# Patient Record
Sex: MALE | Race: WHITE
[De-identification: names, ages, dates, MRNs, and addresses within clinical notes are randomized per-mention and may not be internally consistent; named-entity substitution may affect disease eponyms.]

---

## 2019-10-31 ENCOUNTER — HOSPITAL ENCOUNTER (INPATIENT)
Dept: HOSPITAL 15 - ER | Age: 74
LOS: 1 days | Discharge: HOME | DRG: 603 | End: 2019-11-01
Attending: INTERNAL MEDICINE | Admitting: INTERNAL MEDICINE
Payer: COMMERCIAL

## 2019-10-31 VITALS — WEIGHT: 257.94 LBS | BODY MASS INDEX: 36.11 KG/M2 | HEIGHT: 71 IN

## 2019-10-31 VITALS — DIASTOLIC BLOOD PRESSURE: 76 MMHG | SYSTOLIC BLOOD PRESSURE: 133 MMHG

## 2019-10-31 DIAGNOSIS — S91.002A: ICD-10-CM

## 2019-10-31 DIAGNOSIS — Z79.01: ICD-10-CM

## 2019-10-31 DIAGNOSIS — Z83.3: ICD-10-CM

## 2019-10-31 DIAGNOSIS — E11.22: ICD-10-CM
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
## 2019-10-31 DIAGNOSIS — N18.9: ICD-10-CM

## 2019-10-31 DIAGNOSIS — I48.92: ICD-10-CM

## 2019-10-31 DIAGNOSIS — E78.5: ICD-10-CM

## 2019-10-31 DIAGNOSIS — Z87.891: ICD-10-CM

## 2019-10-31 DIAGNOSIS — L03.116: Primary | ICD-10-CM

## 2019-10-31 DIAGNOSIS — E11.622: ICD-10-CM

## 2019-10-31 DIAGNOSIS — I47.1: ICD-10-CM

## 2019-10-31 DIAGNOSIS — L97.329: ICD-10-CM

## 2019-10-31 DIAGNOSIS — I12.9: ICD-10-CM
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
## 2019-10-31 DIAGNOSIS — I48.0: ICD-10-CM

## 2019-10-31 LAB
ALBUMIN SERPL-MCNC: 3.7 G/DL (ref 3.4–5)
ALP SERPL-CCNC: 109 U/L (ref 45–117)
ALT SERPL-CCNC: 24 U/L (ref 16–61)
ANION GAP SERPL CALCULATED.3IONS-SCNC: 8 MMOL/L (ref 5–15)
BILIRUB SERPL-MCNC: 0.7 MG/DL (ref 0.2–1)
BUN SERPL-MCNC: 19 MG/DL (ref 7–18)
BUN/CREAT SERPL: 14.1
CALCIUM SERPL-MCNC: 9.8 MG/DL (ref 8.5–10.1)
CHLORIDE SERPL-SCNC: 104 MMOL/L (ref 98–107)
CO2 SERPL-SCNC: 25 MMOL/L (ref 21–32)
GLUCOSE SERPL-MCNC: 187 MG/DL (ref 74–106)
HCT VFR BLD AUTO: 41 % (ref 41–53)
HGB BLD-MCNC: 13.7 G/DL (ref 13.5–17.5)
MAGNESIUM SERPL-MCNC: 1.8 MG/DL (ref 1.6–2.6)
MCH RBC QN AUTO: 30.2 PG (ref 28–32)
MCV RBC AUTO: 90.3 FL (ref 80–100)
NRBC BLD QL AUTO: 0 %
POTASSIUM SERPL-SCNC: 4.5 MMOL/L (ref 3.5–5.1)
PROT SERPL-MCNC: 7.9 G/DL (ref 6.4–8.2)
SODIUM SERPL-SCNC: 137 MMOL/L (ref 136–145)

## 2019-10-31 PROCEDURE — 93005 ELECTROCARDIOGRAM TRACING: CPT

## 2019-10-31 PROCEDURE — 71045 X-RAY EXAM CHEST 1 VIEW: CPT

## 2019-10-31 PROCEDURE — 93971 EXTREMITY STUDY: CPT

## 2019-10-31 PROCEDURE — 85025 COMPLETE CBC W/AUTO DIFF WBC: CPT

## 2019-10-31 PROCEDURE — 93306 TTE W/DOPPLER COMPLETE: CPT

## 2019-10-31 PROCEDURE — 83880 ASSAY OF NATRIURETIC PEPTIDE: CPT

## 2019-10-31 PROCEDURE — 83735 ASSAY OF MAGNESIUM: CPT

## 2019-10-31 PROCEDURE — 81001 URINALYSIS AUTO W/SCOPE: CPT

## 2019-10-31 PROCEDURE — 84484 ASSAY OF TROPONIN QUANT: CPT

## 2019-10-31 PROCEDURE — 80053 COMPREHEN METABOLIC PANEL: CPT

## 2019-10-31 PROCEDURE — 94761 N-INVAS EAR/PLS OXIMETRY MLT: CPT

## 2019-10-31 PROCEDURE — 36415 COLL VENOUS BLD VENIPUNCTURE: CPT

## 2019-10-31 RX ADMIN — METOPROLOL TARTRATE SCH MG: 25 TABLET, FILM COATED ORAL at 22:00

## 2019-10-31 RX ADMIN — ENOXAPARIN SODIUM SCH MG: 100 INJECTION SUBCUTANEOUS at 21:38

## 2019-10-31 NOTE — NUR
Telemetry admit from ER

CHRISTIANO YING admitted to Telemetry unit after SBAR received.  Patient oriented to RAIMUNDO LIGHT RN primary RN, unit, room, bed, and unit policies regarding patient care and 
visiting hours. Patient now on continuous telemetry monitoring, tele box # 65 and telemetry 
reading on arrival to unit is A-flutter 108 bpm. Patient weighed by bedscale and encouraged 
to call if they need something. All questions and concerns addressed, patient verbalized 
understanding. 

Patient is A/O x4, no complaints of pain, distress, or SOB, he is on RA, ambulatory. Call 
light is within reach, side rails up x2, bed is in lowest position.

## 2019-11-01 VITALS — DIASTOLIC BLOOD PRESSURE: 72 MMHG | SYSTOLIC BLOOD PRESSURE: 121 MMHG

## 2019-11-01 VITALS — DIASTOLIC BLOOD PRESSURE: 82 MMHG | SYSTOLIC BLOOD PRESSURE: 122 MMHG

## 2019-11-01 VITALS — DIASTOLIC BLOOD PRESSURE: 68 MMHG | SYSTOLIC BLOOD PRESSURE: 115 MMHG

## 2019-11-01 VITALS — DIASTOLIC BLOOD PRESSURE: 80 MMHG | SYSTOLIC BLOOD PRESSURE: 136 MMHG

## 2019-11-01 LAB
ALBUMIN SERPL-MCNC: 3.3 G/DL (ref 3.4–5)
ALP SERPL-CCNC: 86 U/L (ref 45–117)
ALT SERPL-CCNC: 20 U/L (ref 16–61)
ANION GAP SERPL CALCULATED.3IONS-SCNC: 8 MMOL/L (ref 5–15)
BILIRUB SERPL-MCNC: 0.5 MG/DL (ref 0.2–1)
BUN SERPL-MCNC: 17 MG/DL (ref 7–18)
BUN/CREAT SERPL: 15.2
CALCIUM SERPL-MCNC: 9.4 MG/DL (ref 8.5–10.1)
CHLORIDE SERPL-SCNC: 108 MMOL/L (ref 98–107)
CO2 SERPL-SCNC: 24 MMOL/L (ref 21–32)
GLUCOSE SERPL-MCNC: 147 MG/DL (ref 74–106)
HCT VFR BLD AUTO: 36.9 % (ref 41–53)
HGB BLD-MCNC: 12.6 G/DL (ref 13.5–17.5)
MAGNESIUM SERPL-MCNC: 1.9 MG/DL (ref 1.6–2.6)
MCH RBC QN AUTO: 30.7 PG (ref 28–32)
MCV RBC AUTO: 90.1 FL (ref 80–100)
NRBC BLD QL AUTO: 0.1 %
POTASSIUM SERPL-SCNC: 4.4 MMOL/L (ref 3.5–5.1)
PROT SERPL-MCNC: 6.9 G/DL (ref 6.4–8.2)
SODIUM SERPL-SCNC: 140 MMOL/L (ref 136–145)

## 2019-11-01 RX ADMIN — METOPROLOL TARTRATE SCH MG: 25 TABLET, FILM COATED ORAL at 09:13

## 2019-11-01 RX ADMIN — ENOXAPARIN SODIUM SCH MG: 100 INJECTION SUBCUTANEOUS at 09:14

## 2019-11-01 NOTE — NUR
WOUND CARE NOTE: IN TO SEE PATIENT AT THIS TIME PER WOUND CARE CONSULT REQUEST. PATIENT 
ADMITTED TO [facility] WITH SVT, A FLUTTER. CURRENT PRATIBHA SCORE IS 20. PATIENT IS FULLY 
AMBULATORY.  HE HAS HISTORY WITH DVT, VARICOSE VEINS, AND VENOUS STASIS ULCERS.  THERE IS 
HEMOSIDERIN STAIN NOTED TO BILATERAL LOWER EXTREMITIES.  HE HAS AN INTACT VENOUS STASIS 
ULCER TO THE LEFT MEDIAL ANKLE.  WOUND IS NOT OPEN OR DRAINING.  WOUND IS SCABBED CLOSED, 
WITH BROWN ESCHAR SCAB.  PERIWOUND IS BRIGHT RED.  LEFT OPEN TO AIR.  PATIENT EDUCATED IN 
WOUND CARE AND THE NEED FOR PATIENT TO WEAR HIS PRESCRIBED COMPRESSION SOCKS. PATIENT 
VERBALIZED UNDERSTANDING.  SKIN/WOUND CARE PLAN IMPLEMENTED.  PATIENT WOULD BENEFIT FROM 
ELEVATION OF BLE FOR EDEMA CONTROL.  PATIENT WILL BE DISCHARGING THIS PM.

-------------------------------------------------------------------------------

Addendum: 11/01/19 at 1729 by Lexi Sky RN

-------------------------------------------------------------------------------

Amended: Links added.

## 2019-11-01 NOTE — NUR
PAGED DR. FRASER TO CLARIFY DISCHARGE ORDER. PER DR. EVELIO DOSS WENT TO SEE PATIENT IN 
THE MORNING AND HAD CLEARED PATIENT FOR DISCHARGE. WILL NOTIFY PATIENT.

## 2019-11-01 NOTE — NUR
PAGE LEFT AT DR. DOSS'S OFFICE; REGARDING CARDIO CLEARANCE DUE TO DISCHARGE ORDER BY DR. FRASER 

MESSAGE TAKEN BY REPRESENTATIVE MAMIE.

## 2019-11-01 NOTE — NUR
Discharge instructions given as ordered. Encourage to follow up with PMD as instructed. 
Reminded patient to call MD office on Monday,  unable to make any appointments at this time 
because MD offices are already closed, verbalized understanding. All questions and concerns 
addressed. Patient verbalized understanding.  Medication reconciliation form completed and 
copy given to patient. IV removed with catheter intact, pressure dressing applied, reyez 
catheter removed.  Telemetry unit returned to ICU, made tele tech aware. Patient taken to 
vehicle via wheelchair with all personal belongings, accompanied by staff. No distress noted 
at time of departure.

## 2019-11-11 ENCOUNTER — HOSPITAL ENCOUNTER (EMERGENCY)
Dept: HOSPITAL 15 - ER | Age: 74
Discharge: HOME | End: 2019-11-11
Payer: COMMERCIAL

## 2019-11-11 VITALS — DIASTOLIC BLOOD PRESSURE: 72 MMHG | SYSTOLIC BLOOD PRESSURE: 121 MMHG

## 2019-11-11 VITALS — HEIGHT: 71 IN | BODY MASS INDEX: 39.06 KG/M2 | WEIGHT: 279 LBS

## 2019-11-11 DIAGNOSIS — E11.65: ICD-10-CM

## 2019-11-11 DIAGNOSIS — E78.5: ICD-10-CM

## 2019-11-11 DIAGNOSIS — I48.3: Primary | ICD-10-CM

## 2019-11-11 DIAGNOSIS — Z88.8: ICD-10-CM

## 2019-11-11 DIAGNOSIS — E66.01: ICD-10-CM

## 2019-11-11 LAB
ALBUMIN SERPL-MCNC: 3.6 G/DL (ref 3.4–5)
ALP SERPL-CCNC: 97 U/L (ref 45–117)
ALT SERPL-CCNC: 26 U/L (ref 16–61)
ANION GAP SERPL CALCULATED.3IONS-SCNC: 6 MMOL/L (ref 5–15)
APTT PPP: 28.5 SEC (ref 23.64–32.05)
BILIRUB SERPL-MCNC: 0.7 MG/DL (ref 0.2–1)
BUN SERPL-MCNC: 10 MG/DL (ref 7–18)
BUN/CREAT SERPL: 8.1
CALCIUM SERPL-MCNC: 8.6 MG/DL (ref 8.5–10.1)
CHLORIDE SERPL-SCNC: 106 MMOL/L (ref 98–107)
CO2 SERPL-SCNC: 25 MMOL/L (ref 21–32)
GLUCOSE SERPL-MCNC: 149 MG/DL (ref 74–106)
HCT VFR BLD AUTO: 37.4 % (ref 41–53)
HGB BLD-MCNC: 12.5 G/DL (ref 13.5–17.5)
INR PPP: 1.01 (ref 0.9–1.15)
MCH RBC QN AUTO: 30.2 PG (ref 28–32)
MCV RBC AUTO: 90.6 FL (ref 80–100)
NRBC BLD QL AUTO: 0.1 %
POTASSIUM SERPL-SCNC: 4.2 MMOL/L (ref 3.5–5.1)
PROT SERPL-MCNC: 7.3 G/DL (ref 6.4–8.2)
SODIUM SERPL-SCNC: 137 MMOL/L (ref 136–145)

## 2019-11-11 PROCEDURE — 85730 THROMBOPLASTIN TIME PARTIAL: CPT

## 2019-11-11 PROCEDURE — 83880 ASSAY OF NATRIURETIC PEPTIDE: CPT

## 2019-11-11 PROCEDURE — 85025 COMPLETE CBC W/AUTO DIFF WBC: CPT

## 2019-11-11 PROCEDURE — 84484 ASSAY OF TROPONIN QUANT: CPT

## 2019-11-11 PROCEDURE — 93005 ELECTROCARDIOGRAM TRACING: CPT

## 2019-11-11 PROCEDURE — 96361 HYDRATE IV INFUSION ADD-ON: CPT

## 2019-11-11 PROCEDURE — 96374 THER/PROPH/DIAG INJ IV PUSH: CPT

## 2019-11-11 PROCEDURE — 36415 COLL VENOUS BLD VENIPUNCTURE: CPT

## 2019-11-11 PROCEDURE — 74176 CT ABD & PELVIS W/O CONTRAST: CPT

## 2019-11-11 PROCEDURE — 85610 PROTHROMBIN TIME: CPT

## 2019-11-11 PROCEDURE — 71045 X-RAY EXAM CHEST 1 VIEW: CPT

## 2019-11-11 PROCEDURE — 80053 COMPREHEN METABOLIC PANEL: CPT

## 2019-11-11 PROCEDURE — 99284 EMERGENCY DEPT VISIT MOD MDM: CPT

## 2019-12-24 ENCOUNTER — HOSPITAL ENCOUNTER (EMERGENCY)
Dept: HOSPITAL 15 - ER | Age: 74
LOS: 1 days | Discharge: HOME | End: 2019-12-25
Payer: COMMERCIAL

## 2019-12-24 VITALS — BODY MASS INDEX: 37.1 KG/M2 | HEIGHT: 71 IN | WEIGHT: 265 LBS

## 2019-12-24 DIAGNOSIS — R07.89: ICD-10-CM

## 2019-12-24 DIAGNOSIS — Z87.891: ICD-10-CM

## 2019-12-24 DIAGNOSIS — Z79.899: ICD-10-CM

## 2019-12-24 DIAGNOSIS — Z88.8: ICD-10-CM

## 2019-12-24 DIAGNOSIS — E78.5: ICD-10-CM

## 2019-12-24 DIAGNOSIS — I48.20: Primary | ICD-10-CM

## 2019-12-24 DIAGNOSIS — E11.9: ICD-10-CM

## 2019-12-24 PROCEDURE — 85007 BL SMEAR W/DIFF WBC COUNT: CPT

## 2019-12-24 PROCEDURE — 96375 TX/PRO/DX INJ NEW DRUG ADDON: CPT

## 2019-12-24 PROCEDURE — 93005 ELECTROCARDIOGRAM TRACING: CPT

## 2019-12-24 PROCEDURE — 85027 COMPLETE CBC AUTOMATED: CPT

## 2019-12-24 PROCEDURE — 84484 ASSAY OF TROPONIN QUANT: CPT

## 2019-12-24 PROCEDURE — 99285 EMERGENCY DEPT VISIT HI MDM: CPT

## 2019-12-24 PROCEDURE — 96374 THER/PROPH/DIAG INJ IV PUSH: CPT

## 2019-12-24 PROCEDURE — 71045 X-RAY EXAM CHEST 1 VIEW: CPT

## 2019-12-24 PROCEDURE — 80053 COMPREHEN METABOLIC PANEL: CPT

## 2019-12-24 PROCEDURE — 36415 COLL VENOUS BLD VENIPUNCTURE: CPT

## 2019-12-24 PROCEDURE — 96376 TX/PRO/DX INJ SAME DRUG ADON: CPT

## 2019-12-24 PROCEDURE — 92960 CARDIOVERSION ELECTRIC EXT: CPT

## 2019-12-24 PROCEDURE — 81001 URINALYSIS AUTO W/SCOPE: CPT

## 2019-12-24 PROCEDURE — 83735 ASSAY OF MAGNESIUM: CPT

## 2019-12-24 PROCEDURE — 83880 ASSAY OF NATRIURETIC PEPTIDE: CPT

## 2019-12-25 VITALS — SYSTOLIC BLOOD PRESSURE: 130 MMHG | DIASTOLIC BLOOD PRESSURE: 94 MMHG

## 2019-12-25 LAB
ALBUMIN SERPL-MCNC: 3.3 G/DL (ref 3.4–5)
ALP SERPL-CCNC: 112 U/L (ref 45–117)
ALT SERPL-CCNC: 27 U/L (ref 16–61)
ANION GAP SERPL CALCULATED.3IONS-SCNC: 12 MMOL/L (ref 5–15)
BILIRUB SERPL-MCNC: 0.8 MG/DL (ref 0.2–1)
BUN SERPL-MCNC: 21 MG/DL (ref 7–18)
BUN/CREAT SERPL: 17.2
CALCIUM SERPL-MCNC: 9.2 MG/DL (ref 8.5–10.1)
CHLORIDE SERPL-SCNC: 104 MMOL/L (ref 98–107)
CO2 SERPL-SCNC: 18 MMOL/L (ref 21–32)
GLUCOSE SERPL-MCNC: 179 MG/DL (ref 74–106)
HCT VFR BLD AUTO: 40 % (ref 41–53)
HGB BLD-MCNC: 13.6 G/DL (ref 13.5–17.5)
MAGNESIUM SERPL-MCNC: 2.3 MG/DL (ref 1.6–2.6)
MCH RBC QN AUTO: 30.2 PG (ref 28–32)
MCV RBC AUTO: 89.1 FL (ref 80–100)
POTASSIUM SERPL-SCNC: 3.8 MMOL/L (ref 3.5–5.1)
PROT SERPL-MCNC: 8 G/DL (ref 6.4–8.2)
SODIUM SERPL-SCNC: 134 MMOL/L (ref 136–145)